# Patient Record
Sex: FEMALE | URBAN - METROPOLITAN AREA
[De-identification: names, ages, dates, MRNs, and addresses within clinical notes are randomized per-mention and may not be internally consistent; named-entity substitution may affect disease eponyms.]

---

## 2021-06-29 ENCOUNTER — APPOINTMENT (RX ONLY)
Dept: URBAN - METROPOLITAN AREA CLINIC 87 | Facility: CLINIC | Age: 21
Setting detail: DERMATOLOGY
End: 2021-06-29

## 2021-06-29 DIAGNOSIS — L65.8 OTHER SPECIFIED NONSCARRING HAIR LOSS: ICD-10-CM

## 2021-06-29 PROCEDURE — ? PRESCRIPTION

## 2021-06-29 PROCEDURE — ? COUNSELING

## 2021-06-29 PROCEDURE — 99204 OFFICE O/P NEW MOD 45 MIN: CPT

## 2021-06-29 PROCEDURE — ? ORDER TESTS

## 2021-06-29 ASSESSMENT — LOCATION DETAILED DESCRIPTION DERM
LOCATION DETAILED: RIGHT SUPERIOR PARIETAL SCALP
LOCATION DETAILED: LEFT SUPERIOR PARIETAL SCALP
LOCATION DETAILED: RIGHT CENTRAL FRONTAL SCALP
LOCATION DETAILED: MID-FRONTAL SCALP

## 2021-06-29 ASSESSMENT — LOCATION SIMPLE DESCRIPTION DERM
LOCATION SIMPLE: RIGHT SCALP
LOCATION SIMPLE: SCALP
LOCATION SIMPLE: ANTERIOR SCALP

## 2021-06-29 ASSESSMENT — LOCATION ZONE DERM: LOCATION ZONE: SCALP

## 2021-06-29 NOTE — HPI: HAIR LOSS
Previous Labs: Yes
How Did The Hair Loss Occur?: gradual in onset
How Severe Is Your Hair Loss?: mild
Additional History: Patient also reports Vitamin D deficiency. Mom has thyroid issues and thinning hair.
Lab Details: With the last month

## 2021-06-29 NOTE — PROCEDURE: ORDER TESTS
Performing Laboratory: [id number]
Bill For Surgical Tray: no
Expected Date Of Service: 06/29/2021
Billing Type: Third-Party Bill